# Patient Record
Sex: MALE | Race: ASIAN | Employment: OTHER | URBAN - METROPOLITAN AREA
[De-identification: names, ages, dates, MRNs, and addresses within clinical notes are randomized per-mention and may not be internally consistent; named-entity substitution may affect disease eponyms.]

---

## 2017-09-04 ENCOUNTER — APPOINTMENT (OUTPATIENT)
Dept: ULTRASOUND IMAGING | Facility: HOSPITAL | Age: 67
DRG: 186 | End: 2017-09-04
Attending: EMERGENCY MEDICINE
Payer: MEDICARE

## 2017-09-04 ENCOUNTER — APPOINTMENT (OUTPATIENT)
Dept: GENERAL RADIOLOGY | Facility: HOSPITAL | Age: 67
DRG: 186 | End: 2017-09-04
Attending: EMERGENCY MEDICINE
Payer: MEDICARE

## 2017-09-04 ENCOUNTER — HOSPITAL ENCOUNTER (INPATIENT)
Facility: HOSPITAL | Age: 67
LOS: 1 days | Discharge: HOME | DRG: 186 | End: 2017-09-05
Attending: EMERGENCY MEDICINE | Admitting: HOSPITALIST
Payer: MEDICARE

## 2017-09-04 DIAGNOSIS — K74.60 LIVER CIRRHOSIS SECONDARY TO NASH (HCC): ICD-10-CM

## 2017-09-04 DIAGNOSIS — R06.03 RESPIRATORY DISTRESS: ICD-10-CM

## 2017-09-04 DIAGNOSIS — D64.9 ANEMIA, UNSPECIFIED TYPE: ICD-10-CM

## 2017-09-04 DIAGNOSIS — D69.6 THROMBOCYTOPENIA (HCC): ICD-10-CM

## 2017-09-04 DIAGNOSIS — K75.81 LIVER CIRRHOSIS SECONDARY TO NASH (HCC): ICD-10-CM

## 2017-09-04 DIAGNOSIS — E87.6 HYPOKALEMIA: ICD-10-CM

## 2017-09-04 DIAGNOSIS — E87.3 RESPIRATORY ALKALOSIS: ICD-10-CM

## 2017-09-04 DIAGNOSIS — J90 PLEURAL EFFUSION: Primary | ICD-10-CM

## 2017-09-04 DIAGNOSIS — E87.1 HYPONATREMIA: ICD-10-CM

## 2017-09-04 DIAGNOSIS — R79.89 ELEVATED D-DIMER: ICD-10-CM

## 2017-09-04 LAB
ALBUMIN SERPL-MCNC: 2.6 G/DL (ref 3.5–4.8)
ALLENS TEST: POSITIVE
ALP LIVER SERPL-CCNC: 148 U/L (ref 45–117)
ALT SERPL-CCNC: 35 U/L (ref 17–63)
ANTIBODY SCREEN: NEGATIVE
APTT PPP: 30.7 SECONDS (ref 25–34)
ARTERIAL BLD GAS O2 SATURATION: 94 % (ref 92–100)
ARTERIAL BLOOD GAS BASE EXCESS: 4
ARTERIAL BLOOD GAS HCO3: 27.1 MEQ/L (ref 22–26)
ARTERIAL BLOOD GAS PCO2: 34 MM HG (ref 35–45)
ARTERIAL BLOOD GAS PH: 7.52 (ref 7.35–7.45)
ARTERIAL BLOOD GAS PO2: 76 MM HG (ref 80–105)
AST SERPL-CCNC: 41 U/L (ref 15–41)
BASOPHILS # BLD AUTO: 0.02 X10(3) UL (ref 0–0.1)
BASOPHILS NFR BLD AUTO: 0.4 %
BILIRUB SERPL-MCNC: 1 MG/DL (ref 0.1–2)
BILIRUB UR QL STRIP.AUTO: NEGATIVE
BUN BLD-MCNC: 26 MG/DL (ref 8–20)
CALCIUM BLD-MCNC: 8.3 MG/DL (ref 8.3–10.3)
CALCULATED O2 SATURATION: 97 % (ref 92–100)
CARBOXYHEMOGLOBIN: 2.4 % SAT (ref 0–3)
CHLORIDE: 84 MMOL/L (ref 101–111)
CLARITY UR REFRACT.AUTO: CLEAR
CO2: 30 MMOL/L (ref 22–32)
COLOR UR AUTO: YELLOW
CREAT BLD-MCNC: 1.28 MG/DL (ref 0.7–1.3)
D-DIMER: 0.78 UG/ML FEU (ref 0–0.49)
EOSINOPHIL # BLD AUTO: 0.02 X10(3) UL (ref 0–0.3)
EOSINOPHIL NFR BLD AUTO: 0.4 %
ERYTHROCYTE [DISTWIDTH] IN BLOOD BY AUTOMATED COUNT: 17.6 % (ref 11.5–16)
GLUCOSE BLD-MCNC: 155 MG/DL (ref 70–99)
GLUCOSE UR STRIP.AUTO-MCNC: NEGATIVE MG/DL
HCT VFR BLD AUTO: 23.5 % (ref 37–53)
HGB BLD-MCNC: 7.7 G/DL (ref 13–17)
IMMATURE GRANULOCYTE COUNT: 0.03 X10(3) UL (ref 0–1)
IMMATURE GRANULOCYTE RATIO %: 0.7 %
INR BLD: 1.18 (ref 0.89–1.11)
IONIZED CALCIUM: 1.02 MMOL/L (ref 1.12–1.32)
KETONES UR STRIP.AUTO-MCNC: NEGATIVE MG/DL
L/M: 3 L/MIN
LACTIC ACID ARTERIAL: 2.5 MMOL/L (ref 0.5–2)
LEUKOCYTE ESTERASE UR QL STRIP.AUTO: NEGATIVE
LYMPHOCYTES # BLD AUTO: 0.15 X10(3) UL (ref 0.9–4)
LYMPHOCYTES NFR BLD AUTO: 3.4 %
M PROTEIN MFR SERPL ELPH: 6.2 G/DL (ref 6.1–8.3)
MCH RBC QN AUTO: 26.4 PG (ref 27–33.2)
MCHC RBC AUTO-ENTMCNC: 32.8 G/DL (ref 31–37)
MCV RBC AUTO: 80.5 FL (ref 80–99)
METHEMOGLOBIN: 0.6 % SAT (ref 0.4–1.5)
MONOCYTES # BLD AUTO: 0.47 X10(3) UL (ref 0.1–0.6)
MONOCYTES NFR BLD AUTO: 10.5 %
NEUTROPHIL ABS PRELIM: 3.78 X10 (3) UL (ref 1.3–6.7)
NEUTROPHILS # BLD AUTO: 3.78 X10(3) UL (ref 1.3–6.7)
NEUTROPHILS NFR BLD AUTO: 84.6 %
NITRITE UR QL STRIP.AUTO: NEGATIVE
PATIENT TEMPERATURE: 97.9 F
PH UR STRIP.AUTO: 6 [PH] (ref 4.5–8)
PLATELET # BLD AUTO: 114 10(3)UL (ref 150–450)
POTASSIUM BLOOD GAS: 2.4 MMOL/L (ref 3.6–5.1)
POTASSIUM SERPL-SCNC: 2.7 MMOL/L (ref 3.6–5.1)
PROT UR STRIP.AUTO-MCNC: NEGATIVE MG/DL
PSA SERPL DL<=0.01 NG/ML-MCNC: 15.1 SECONDS (ref 12–14.3)
RBC # BLD AUTO: 2.92 X10(6)UL (ref 3.8–5.8)
RBC UR QL AUTO: NEGATIVE
RED CELL DISTRIBUTION WIDTH-SD: 52.2 FL (ref 35.1–46.3)
RH BLOOD TYPE: POSITIVE
SODIUM BLOOD GAS: 121 MMOL/L (ref 136–144)
SODIUM SERPL-SCNC: 125 MMOL/L (ref 136–144)
SP GR UR STRIP.AUTO: 1.01 (ref 1–1.03)
TOTAL HEMOGLOBIN: 7.3 G/DL (ref 12.6–17.4)
TROPONIN: <0.046 NG/ML (ref ?–0.05)
UROBILINOGEN UR STRIP.AUTO-MCNC: <2 MG/DL
WBC # BLD AUTO: 4.5 X10(3) UL (ref 4–13)

## 2017-09-04 PROCEDURE — 99291 CRITICAL CARE FIRST HOUR: CPT | Performed by: HOSPITALIST

## 2017-09-04 PROCEDURE — 71010 XR CHEST AP PORTABLE  (CPT=71010): CPT | Performed by: NURSE PRACTITIONER

## 2017-09-04 PROCEDURE — 0W9930Z DRAINAGE OF RIGHT PLEURAL CAVITY WITH DRAINAGE DEVICE, PERCUTANEOUS APPROACH: ICD-10-PCS | Performed by: INTERNAL MEDICINE

## 2017-09-04 PROCEDURE — 5A09357 ASSISTANCE WITH RESPIRATORY VENTILATION, LESS THAN 24 CONSECUTIVE HOURS, CONTINUOUS POSITIVE AIRWAY PRESSURE: ICD-10-PCS | Performed by: HOSPITALIST

## 2017-09-04 PROCEDURE — 93970 EXTREMITY STUDY: CPT | Performed by: EMERGENCY MEDICINE

## 2017-09-04 PROCEDURE — 71010 XR CHEST AP PORTABLE  (CPT=71010): CPT | Performed by: EMERGENCY MEDICINE

## 2017-09-04 RX ORDER — POTASSIUM CHLORIDE 29.8 MG/ML
40 INJECTION INTRAVENOUS ONCE
Status: DISCONTINUED | OUTPATIENT
Start: 2017-09-04 | End: 2017-09-05

## 2017-09-04 RX ORDER — LACTULOSE 20 G/30ML
20 SOLUTION ORAL DAILY
COMMUNITY

## 2017-09-04 RX ORDER — ENOXAPARIN SODIUM 100 MG/ML
40 INJECTION SUBCUTANEOUS DAILY
Status: DISCONTINUED | OUTPATIENT
Start: 2017-09-04 | End: 2017-09-05

## 2017-09-04 RX ORDER — ONDANSETRON 2 MG/ML
4 INJECTION INTRAMUSCULAR; INTRAVENOUS EVERY 6 HOURS PRN
Status: DISCONTINUED | OUTPATIENT
Start: 2017-09-04 | End: 2017-09-05

## 2017-09-04 RX ORDER — ACETAMINOPHEN 325 MG/1
650 TABLET ORAL EVERY 6 HOURS PRN
Status: DISCONTINUED | OUTPATIENT
Start: 2017-09-04 | End: 2017-09-05

## 2017-09-04 RX ORDER — FUROSEMIDE 10 MG/ML
40 INJECTION INTRAMUSCULAR; INTRAVENOUS ONCE
Status: COMPLETED | OUTPATIENT
Start: 2017-09-04 | End: 2017-09-04

## 2017-09-04 RX ORDER — SPIRONOLACTONE 25 MG/1
50 TABLET ORAL DAILY
COMMUNITY

## 2017-09-04 RX ORDER — POTASSIUM CHLORIDE 14.9 MG/ML
20 INJECTION INTRAVENOUS ONCE
Status: COMPLETED | OUTPATIENT
Start: 2017-09-04 | End: 2017-09-04

## 2017-09-04 NOTE — ED PROVIDER NOTES
Patient Seen in: [facility] Emergency Department    History   Patient presents with:  Swelling Edema (cardiovascular, metabolic)    Stated Complaint: sob. leg swelling    HPI    Patient lives part-time in [facility] [place] and part-time in [place].   He has a tachypneic and borderline hypoxic  Eyes: sclera white, conjunctiva pink and moist.  Lids and lashes are normal.  Nose: Unremarkable   Throat: Posterior pharynx is normal.    Neck: Supple with JVD  Lungs:  diminished especially on the right  Heart: Normal S HCT 23.5 (*)     .0 (*)     MCH 26.4 (*)     RDW 17.6 (*)     RDW-SD 52.2 (*)     Lymphocyte Absolute 0.15 (*)     All other components within normal limits   TROPONIN I - Normal   PTT, ACTIVATED - Normal    Narrative:      The aPTT Heparin Therapeut is tachypneic, tachycardic, hypoxic. He has a history of cancer with cirrhosis and recurrent pleural effusions. He has had recent travel.   Recurrent pleural effusions as well as pulmonary embolism are included in the differential.    CBC shows normal whi refusing treatment could lead to potentially disastrous outcome is patient presented here quite dyspneic and tachypneic. Once patient is diuresed, thoracentesis may be more efficacious as the effusion may not reaccumulate so quickly.     Disposition and Pl

## 2017-09-04 NOTE — ED INITIAL ASSESSMENT (HPI)
Pt to ED for LESLIE and increased swelling to lower extremities. Pt has CXR on Thur, diagnosed with fluid in his lungs. Pt flew from [place] on Saturday. Afebrile. Productive cough. Denies CP. Labored breathing.    Family sts pt had 1600mls drained from rig

## 2017-09-05 ENCOUNTER — APPOINTMENT (OUTPATIENT)
Dept: GENERAL RADIOLOGY | Facility: HOSPITAL | Age: 67
DRG: 186 | End: 2017-09-05
Attending: NURSE PRACTITIONER
Payer: MEDICARE

## 2017-09-05 ENCOUNTER — APPOINTMENT (OUTPATIENT)
Dept: ULTRASOUND IMAGING | Facility: HOSPITAL | Age: 67
DRG: 186 | End: 2017-09-05
Attending: INTERNAL MEDICINE
Payer: MEDICARE

## 2017-09-05 ENCOUNTER — APPOINTMENT (OUTPATIENT)
Dept: GENERAL RADIOLOGY | Facility: HOSPITAL | Age: 67
DRG: 186 | End: 2017-09-05
Attending: INTERNAL MEDICINE
Payer: MEDICARE

## 2017-09-05 VITALS
WEIGHT: 162 LBS | HEART RATE: 103 BPM | DIASTOLIC BLOOD PRESSURE: 44 MMHG | OXYGEN SATURATION: 97 % | HEIGHT: 67 IN | RESPIRATION RATE: 23 BRPM | SYSTOLIC BLOOD PRESSURE: 105 MMHG | BODY MASS INDEX: 25.43 KG/M2 | TEMPERATURE: 99 F

## 2017-09-05 PROBLEM — D64.9 ANEMIA: Status: ACTIVE | Noted: 2017-09-04

## 2017-09-05 LAB
ALBUMIN SERPL-MCNC: 2.1 G/DL (ref 3.5–4.8)
ALP LIVER SERPL-CCNC: 105 U/L (ref 45–117)
ALT SERPL-CCNC: 29 U/L (ref 17–63)
AMMONIA: 73 UMOL/L (ref 11–32)
AST SERPL-CCNC: 32 U/L (ref 15–41)
ATRIAL RATE: 108 BPM
ATRIAL RATE: 108 BPM
BASOPHIL PLEURAL FLUID: 0 %
BASOPHILS # BLD AUTO: 0.01 X10(3) UL (ref 0–0.1)
BASOPHILS # BLD AUTO: 0.01 X10(3) UL (ref 0–0.1)
BASOPHILS NFR BLD AUTO: 0.4 %
BASOPHILS NFR BLD AUTO: 0.4 %
BILIRUB SERPL-MCNC: 1.4 MG/DL (ref 0.1–2)
BUN BLD-MCNC: 24 MG/DL (ref 8–20)
CALCIUM BLD-MCNC: 8.1 MG/DL (ref 8.3–10.3)
CHLORIDE: 88 MMOL/L (ref 101–111)
CO2: 30 MMOL/L (ref 22–32)
CREAT BLD-MCNC: 1.12 MG/DL (ref 0.7–1.3)
EOSINOPHIL # BLD AUTO: 0.03 X10(3) UL (ref 0–0.3)
EOSINOPHIL # BLD AUTO: 0.03 X10(3) UL (ref 0–0.3)
EOSINOPHIL NFR BLD AUTO: 1.1 %
EOSINOPHIL NFR BLD AUTO: 1.1 %
EOSINOPHIL PLEURAL FLUID: 0 %
ERYTHROCYTE [DISTWIDTH] IN BLOOD BY AUTOMATED COUNT: 17.4 % (ref 11.5–16)
ERYTHROCYTE [DISTWIDTH] IN BLOOD BY AUTOMATED COUNT: 17.7 % (ref 11.5–16)
GLUCOSE BLD-MCNC: 110 MG/DL (ref 70–99)
GLUCOSE PLEURAL FLUID: 171 MG/DL
HCT VFR BLD AUTO: 19.2 % (ref 37–53)
HCT VFR BLD AUTO: 19.8 % (ref 37–53)
HGB BLD-MCNC: 6.3 G/DL (ref 13–17)
HGB BLD-MCNC: 6.4 G/DL (ref 13–17)
IMMATURE GRANULOCYTE COUNT: 0.02 X10(3) UL (ref 0–1)
IMMATURE GRANULOCYTE COUNT: 0.02 X10(3) UL (ref 0–1)
IMMATURE GRANULOCYTE RATIO %: 0.7 %
IMMATURE GRANULOCYTE RATIO %: 0.7 %
LDH PLEURAL FLUID: 36 U/L
LYMPHOCYTE PLEURAL FLUID: 44 %
LYMPHOCYTES # BLD AUTO: 0.23 X10(3) UL (ref 0.9–4)
LYMPHOCYTES # BLD AUTO: 0.25 X10(3) UL (ref 0.9–4)
LYMPHOCYTES NFR BLD AUTO: 8.4 %
LYMPHOCYTES NFR BLD AUTO: 8.8 %
M PROTEIN MFR SERPL ELPH: 5.3 G/DL (ref 6.1–8.3)
MCH RBC QN AUTO: 26 PG (ref 27–33.2)
MCH RBC QN AUTO: 26.4 PG (ref 27–33.2)
MCHC RBC AUTO-ENTMCNC: 32.3 G/DL (ref 31–37)
MCHC RBC AUTO-ENTMCNC: 32.8 G/DL (ref 31–37)
MCV RBC AUTO: 80.3 FL (ref 80–99)
MCV RBC AUTO: 80.5 FL (ref 80–99)
MONO/MAC/HISTIO PLEURAL FLUID: 44 %
MONOCYTES # BLD AUTO: 0.49 X10(3) UL (ref 0.1–0.6)
MONOCYTES # BLD AUTO: 0.53 X10(3) UL (ref 0.1–0.6)
MONOCYTES NFR BLD AUTO: 17.9 %
MONOCYTES NFR BLD AUTO: 18.6 %
NEUTROPHIL ABS PRELIM: 1.96 X10 (3) UL (ref 1.3–6.7)
NEUTROPHIL ABS PRELIM: 2.01 X10 (3) UL (ref 1.3–6.7)
NEUTROPHIL PLEURAL FLUID: 12 %
NEUTROPHILS # BLD AUTO: 1.96 X10(3) UL (ref 1.3–6.7)
NEUTROPHILS # BLD AUTO: 2.01 X10(3) UL (ref 1.3–6.7)
NEUTROPHILS NFR BLD AUTO: 70.4 %
NEUTROPHILS NFR BLD AUTO: 71.5 %
P AXIS: 9 DEGREES
P-R INTERVAL: 160 MS
PLATELET # BLD AUTO: 88 10(3)UL (ref 150–450)
PLATELET # BLD AUTO: 92 10(3)UL (ref 150–450)
POTASSIUM SERPL-SCNC: 3.2 MMOL/L (ref 3.6–5.1)
POTASSIUM SERPL-SCNC: 3.4 MMOL/L (ref 3.6–5.1)
Q-T INTERVAL: 386 MS
Q-T INTERVAL: 388 MS
QRS DURATION: 70 MS
QRS DURATION: 78 MS
QTC CALCULATION (BEZET): 517 MS
QTC CALCULATION (BEZET): 519 MS
R AXIS: 16 DEGREES
R AXIS: 5 DEGREES
RBC # BLD AUTO: 2.39 X10(6)UL (ref 3.8–5.8)
RBC # BLD AUTO: 2.46 X10(6)UL (ref 3.8–5.8)
RBC PLEURAL FLUID: <3000 /MM3
RED CELL DISTRIBUTION WIDTH-SD: 51.4 FL (ref 35.1–46.3)
RED CELL DISTRIBUTION WIDTH-SD: 52.4 FL (ref 35.1–46.3)
SODIUM SERPL-SCNC: 126 MMOL/L (ref 136–144)
T AXIS: 62 DEGREES
T AXIS: 95 DEGREES
TOTAL CELLS COUNTED: 25
TOTAL PROTEIN PLEURAL FLUID: <1 G/DL
VENTRICULAR RATE: 108 BPM
VENTRICULAR RATE: 108 BPM
WBC # BLD AUTO: 2.7 X10(3) UL (ref 4–13)
WBC # BLD AUTO: 2.9 X10(3) UL (ref 4–13)
WBC PLEURAL FLUID: 21 /MM3

## 2017-09-05 PROCEDURE — 32555 ASPIRATE PLEURA W/ IMAGING: CPT | Performed by: INTERNAL MEDICINE

## 2017-09-05 PROCEDURE — 0W993ZZ DRAINAGE OF RIGHT PLEURAL CAVITY, PERCUTANEOUS APPROACH: ICD-10-PCS | Performed by: INTERNAL MEDICINE

## 2017-09-05 PROCEDURE — 71010 XR CHEST AP PORTABLE  (CPT=71010): CPT | Performed by: INTERNAL MEDICINE

## 2017-09-05 PROCEDURE — 71010 XR CHEST AP PORTABLE  (CPT=71010): CPT | Performed by: NURSE PRACTITIONER

## 2017-09-05 PROCEDURE — 99238 HOSP IP/OBS DSCHRG MGMT 30/<: CPT | Performed by: HOSPITALIST

## 2017-09-05 RX ORDER — LACTULOSE 20 G/30ML
20 SOLUTION ORAL DAILY
Status: DISCONTINUED | OUTPATIENT
Start: 2017-09-05 | End: 2017-09-05

## 2017-09-05 RX ORDER — POTASSIUM CHLORIDE 20 MEQ/1
40 TABLET, EXTENDED RELEASE ORAL EVERY 4 HOURS
Status: COMPLETED | OUTPATIENT
Start: 2017-09-05 | End: 2017-09-05

## 2017-09-05 RX ORDER — SODIUM CHLORIDE 9 MG/ML
INJECTION, SOLUTION INTRAVENOUS ONCE
Status: DISCONTINUED | OUTPATIENT
Start: 2017-09-05 | End: 2017-09-05

## 2017-09-05 RX ORDER — SPIRONOLACTONE 25 MG/1
50 TABLET ORAL DAILY
Status: DISCONTINUED | OUTPATIENT
Start: 2017-09-05 | End: 2017-09-05

## 2017-09-05 NOTE — PROGRESS NOTES
[facility] Patient Status:  Inpatient    3/3/1950 MRN [id number]   Location [address] [place] [address]-A Attending Efrain Espinosa MD   Hosp Day # 1 PCP No primary care provider on file.      Critical Care Progress Note     Date of Admis Results  Component Value Date   WBC 2.7 09/05/2017   RBC 2.46 09/05/2017   HGB 6.4 09/05/2017   HCT 19.8 09/05/2017   MCV 80.5 09/05/2017   MCH 26.0 09/05/2017   MCHC 32.3 09/05/2017   RDW 17.7 09/05/2017   PLT 88.0 09/05/2017       Lab Results  Component up with [place] hepatologist [place] -he is contemplating chinese alternative medicine in [facility]  -I did d/w his son and suggested talking about TIPS with his home MD's as he is requiring thoracentesis on a weekly basis.    4. Hyponatremia - due to free water o

## 2017-09-05 NOTE — ED NOTES
US at bedside, ICU charge called and unable to give report at this time due to RN being off the floor.

## 2017-09-05 NOTE — ED NOTES
Report given to Chandler Caballero RN. Room is not clean at this time, nurse will call back once room is clean.

## 2017-09-05 NOTE — ED NOTES
Pt refusing to go to the ICU at this time. Dr. Ricky Kelley and this writer spoke to patient at length about risks of going home, and benefits of being admitted to the ICU. Pt states he wants the fluid drained and needs to go home.  Pt and family asking for a few m

## 2017-09-05 NOTE — PROCEDURES
[facility] Patient Status:  Inpatient    3/3/1950 MRN [id number]   Location [address] [place] [address]-A Attending Jose Luis Echevarria MD   Hosp Day # 0 PCP No primary care provider on file.      Thoracentesis Procedure Note    Consent: Infor

## 2017-09-05 NOTE — PROCEDURES
[address] [place] [address] Patient Status:  Inpatient    3/3/1950 MRN [id number]   Location [facility] 4SW-A Attending Roseline Sands MD   Hosp Day # 0 PCP No primary care provider on file.      Chest Tube Insertion    Indications:resp fail

## 2017-09-05 NOTE — RESPIRATORY THERAPY NOTE
Patient initiated on Bipap 10/5/40%. Vt:0.652 L RR:32 SpO2: 100%. Patient has increased WOB. Lasix given per RN.

## 2017-09-05 NOTE — PLAN OF CARE
Pt d/c with family via wheelchair. All d/c instructions and med lists given and discussed. All questions encouraged and answered. IV d/c'd without s/s of infiltration.

## 2017-09-05 NOTE — PLAN OF CARE
CARDIOVASCULAR - ADULT    • Maintains optimal cardiac output and hemodynamic stability Adequate for Discharge          NEUROLOGICAL - ADULT    • Achieves stable or improved neurological status Adequate for Discharge          RESPIRATORY - ADULT    • Achiev

## 2017-09-05 NOTE — PROGRESS NOTES
Due to his respiratory failure, rapid reaccumulation of fluid from a tap a couple days ago and the need for larger drainage over a longer period of time, I decided to place a small tube thoracostomy for continuous drainage into Pleurevac.     Anticipate kimberly

## 2017-09-05 NOTE — PLAN OF CARE
Patient refusing blood transfusion. Patient verbalized understanding this was recommended by MD. He would like to be discharged so he can go back to [place] to see his regular MDs.

## 2017-09-05 NOTE — PROGRESS NOTES
[facility]  Progress Note     Shaina Bertrand Patient Status:  Inpatient    3/3/1950 MRN [id number]   Location [address] [place] [address]-A Attending Roxy Turner MD   Hosp Day # 1 PCP No primary care provider on file.      Chief Complaint: sob    S: • enoxaparin  40 mg Subcutaneous Daily       ASSESSMENT / PLAN:     1. 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  NIETO cirrhosis-resume spironolactone today; c

## 2017-09-05 NOTE — PROCEDURES
1001 [facility] Patient Status:  Inpatient    3/3/1950 MRN [id number]   Location [facility] 4SW-A Attending Alfredo Palomares MD   Hosp Day # 1 PCP No primary care provider on file.      Thoracentesis Procedure Note    Consent: Info

## 2017-09-05 NOTE — ED NOTES
[facility] gtt slowed down due to patient complaining of pain to IV site. IV pulls blood and flushes without difficulty. Pharmacy called and okayd to slow gtt at this time.

## 2017-09-05 NOTE — H&P
[facility] SIFUENTES[facility]  History and Physical     Shaina Elza Patient Status:  Inpatient    3/3/1950 MRN [id number]   [facility] 4SW-A Attending Poncho Thomas MD   Hosp Day # 0 PCP No primary care provider on file.      Chief Complaint: S Exam:    /65   Pulse 105   Temp 97.9 °F (36.6 °C) (Temporal)   Resp 24   Ht 5' 7\" (1.702 m)   Wt 162 lb (73.5 kg)   SpO2 98%   BMI 25.37 kg/m²   General: No acute distress. Alert and oriented x 3. HEENT: Normocephalic atraumatic.  Moist mucous membr if needed  8. CAD, stable      Quality:  · DVT Prophylaxis: lovenox  · CODE status: Full  · Ledesma: no    Plan of care discussed with patient and family.     Nav Slade MD  4/0/0777    **Certification      PHYSICIAN Certification of Need for Inpatient Hos

## 2017-09-06 LAB — NON GYNE INTERPRETATION: NEGATIVE

## 2017-09-06 NOTE — DISCHARGE SUMMARY
[facility]  DISCHARGE SUMMARY     Mendez Espinal Patient Status:  Inpatient    3/3/1950 MRN [id number]   [facility] 4SW-A Attending Marquita Dave MD   Hosp Day # 1 PCP No primary care provider on file.      Date of Admission:  from [facility] and resides in [place] for some of the year. He is in town for a wedding. According to his family members, he had a thoracentesis on Friday. He travels shortly thereafter. He has had progressively worsening shortness of breath.   He denies O2    Lab/Test results pending at Discharge:   · Pathology pending        Discharge Medication List:     Discharge Medications      CONTINUE taking these medications      Instructions Prescription details   FUROSEMIDE OR      Take 80 mg by mouth daily.    R guarding. Neurologic: No focal neurological deficits. Musculoskeletal: Moves all extremities.   Extremities: 2+ LE edema    Assessment/Plan:  I have had a face-to-face encounter with this patient and agree with yeimy sears's assessment and plan for th

## 2017-09-08 LAB — BLOOD TYPE BARCODE: 5100

## (undated) NOTE — LETTER
Mary Ann Armstrong [address] [address]  [place], [address] [facility]    Consent for Operation  Date: __________________                                Time: _______________    1.  I authorize the performance upon Earmehran Collins the following operation:  Roshan Mayo procedure has been videotaped, the surgeon will obtain the original videotape. The hospital will not be responsible for storage or maintenance of this tape.     6. For the purpose of advancing medical education, I consent to the admittance of observers to t STATEMENTS REQUIRING INSERTION OR COMPLETION WERE FILLED IN.     Signature of Patient:   ___________________________    When the patient is a minor or mentally incompetent to give consent:  Signature of person authorized to consent for patient: ____________

## (undated) NOTE — LETTER
Mary Ann Armstrong 182 [facility] [address]  [place], [address] [facility]    Consent for Operation  Date: __________________                                Time: _______________    1.  I authorize the performance upon Shari Osorio the following operation:  Rob Lainez videotape. The hospitals will not be responsible for storage or maintenance of this tape. 6. For the purpose of advancing medical education, I consent to the admittance of observers to the Operating Room.     7. I authorize the use of any specimen, organs Signature of Patient:   ___________________________    When the patient is a minor or mentally incompetent to give consent:  Signature of person authorized to consent for patient: ___________________________   Relationship to patient: _____________________

## (undated) NOTE — LETTER
[facility]  Santysergei Ebony [address] [facility], [address] [place] [address]    Consent for Operation    Date: __________________    Time: _______________    1.  I authorize the performance upon Mandi Cardenas the following operation:    Ultrasound guided right sided thor videotape. The \[facility]\"" will not be responsible for storage or maintenance of this tape. 6. For the purpose of advancing medical education, I consent to the admittance of observers to the Operating Room.     7. I authorize the use of any specimen, organs Signature of Patient:   ___________________________    When the patient is a minor or mentally incompetent to give consent:  Signature of person authorized to consent for patient: ___________________________   Relationship to patient: _____________________ drugs/illegal medications). Failure to inform my anesthesiologist about these medicines may increase my risk of anesthetic complications. · If I am allergic to anything or have had a reaction to anesthesia before.     3. I understand how the anesthesia med I have discussed the procedure and information above with the patient (or patient’s representative) and answered their questions. The patient or their representative has agreed to have anesthesia services.     _______________________________________________